# Patient Record
Sex: MALE | Race: WHITE | URBAN - METROPOLITAN AREA
[De-identification: names, ages, dates, MRNs, and addresses within clinical notes are randomized per-mention and may not be internally consistent; named-entity substitution may affect disease eponyms.]

---

## 2019-01-15 ENCOUNTER — OFFICE VISIT (OUTPATIENT)
Dept: URBAN - METROPOLITAN AREA CLINIC 23 | Facility: CLINIC | Age: 74
End: 2019-01-15
Payer: MEDICARE

## 2019-01-15 DIAGNOSIS — H18.053 BILATERAL KRUKENBERG'S SPINDLE: ICD-10-CM

## 2019-01-15 PROCEDURE — 92134 CPTRZ OPH DX IMG PST SGM RTA: CPT | Performed by: OPTOMETRIST

## 2019-01-15 PROCEDURE — 92014 COMPRE OPH EXAM EST PT 1/>: CPT | Performed by: OPTOMETRIST

## 2019-01-15 ASSESSMENT — INTRAOCULAR PRESSURE
OS: 22
OD: 18

## 2019-01-15 NOTE — IMPRESSION/PLAN
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 Plan: Discussed diagnosis in detail with patient. No treatment is required at this time. Reassured patient of current condition and treatment. Will continue to observe condition and or symptoms.

## 2019-01-15 NOTE — IMPRESSION/PLAN
Impression: Type 2 diabetes mellitus w/o complication: O15.8. Plan: Discussed diagnosis in detail with patient. Advised and emphasized patient of blood sugar control. Discussed risks of progression. Poor compliance can lead to blindness. OCT macula performed and reviewed with patient today- no signs of diabetic retinopathy. Reassured patient of condition and treatment. Will continue to observe condition and or symptoms.

## 2020-01-31 ENCOUNTER — OFFICE VISIT (OUTPATIENT)
Dept: URBAN - METROPOLITAN AREA CLINIC 23 | Facility: CLINIC | Age: 75
End: 2020-01-31
Payer: MEDICARE

## 2020-01-31 DIAGNOSIS — E11.9 TYPE 2 DIABETES MELLITUS W/O COMPLICATION: Primary | ICD-10-CM

## 2020-01-31 PROCEDURE — 92014 COMPRE OPH EXAM EST PT 1/>: CPT | Performed by: OPTOMETRIST

## 2020-01-31 ASSESSMENT — INTRAOCULAR PRESSURE
OD: 15
OS: 14

## 2020-01-31 ASSESSMENT — KERATOMETRY
OD: 44.63
OS: 44.88

## 2020-01-31 NOTE — IMPRESSION/PLAN
Impression: Type 2 diabetes mellitus w/o complication: E86.0. Plan: Diabetes type II: no background retinopathy, no signs of neovascularization noted. Discussed ocular and systemic benefits of blood sugar control. Optos done. Stable. Monitor yearly.

## 2021-02-24 ENCOUNTER — OFFICE VISIT (OUTPATIENT)
Dept: URBAN - METROPOLITAN AREA CLINIC 23 | Facility: CLINIC | Age: 76
End: 2021-02-24
Payer: MEDICARE

## 2021-02-24 PROCEDURE — 99213 OFFICE O/P EST LOW 20 MIN: CPT | Performed by: OPTOMETRIST

## 2021-02-24 ASSESSMENT — INTRAOCULAR PRESSURE
OS: 22
OD: 22

## 2021-02-24 NOTE — IMPRESSION/PLAN
Impression: Type 2 diabetes mellitus w/o complication: I91.5. Plan: Diabetes type II: no background retinopathy, no signs of neovascularization noted. Discussed ocular and systemic benefits of blood sugar control.

## 2021-10-20 ENCOUNTER — OFFICE VISIT (OUTPATIENT)
Dept: URBAN - METROPOLITAN AREA CLINIC 23 | Facility: CLINIC | Age: 76
End: 2021-10-20
Payer: MEDICARE

## 2021-10-20 DIAGNOSIS — H25.13 AGE-RELATED NUCLEAR CATARACT, BILATERAL: Primary | ICD-10-CM

## 2021-10-20 DIAGNOSIS — H25.11 AGE-RELATED NUCLEAR CATARACT, RIGHT EYE: ICD-10-CM

## 2021-10-20 PROCEDURE — 99204 OFFICE O/P NEW MOD 45 MIN: CPT | Performed by: OPHTHALMOLOGY

## 2021-10-20 ASSESSMENT — VISUAL ACUITY
OD: 20/25
OS: 20/30

## 2021-10-20 ASSESSMENT — INTRAOCULAR PRESSURE
OS: 21
OD: 22

## 2021-10-20 ASSESSMENT — KERATOMETRY
OD: 44.50
OS: 44.88

## 2021-10-20 NOTE — IMPRESSION/PLAN
Impression: Age-related nuclear cataract, bilateral: H25.13. Condition: established, worsening. Vision: vision affected. Plan: Cataract accounts for patient's complaints. Reviewed risks, benefits, and procedure. Patient desires surgery, schedule ce/iol OS then OD, RL2, discussed lens options, distance refractive target, patient is clear for surgery in [facility]. Recommend Dexycu to avoid noncompliance with drops and to help maintain infection/inflammation.

## 2021-10-20 NOTE — IMPRESSION/PLAN
Impression: Age-related nuclear cataract, right eye: H25.11. Plan: Plan for CEIOL OS then OD as stated above.

## 2021-12-06 ENCOUNTER — TESTING ONLY (OUTPATIENT)
Dept: URBAN - METROPOLITAN AREA CLINIC 23 | Facility: CLINIC | Age: 76
End: 2021-12-06
Payer: MEDICARE

## 2021-12-06 ASSESSMENT — PACHYMETRY
OD: 23.58
OD: 3.12
OS: 23.54
OS: 3.20

## 2021-12-14 ENCOUNTER — SURGERY (OUTPATIENT)
Dept: URBAN - METROPOLITAN AREA SURGERY 11 | Facility: SURGERY | Age: 76
End: 2021-12-14
Payer: MEDICARE

## 2021-12-14 PROCEDURE — 66984 XCAPSL CTRC RMVL W/O ECP: CPT | Performed by: OPHTHALMOLOGY

## 2021-12-15 ENCOUNTER — POST-OPERATIVE VISIT (OUTPATIENT)
Dept: URBAN - METROPOLITAN AREA CLINIC 23 | Facility: CLINIC | Age: 76
End: 2021-12-15

## 2021-12-15 PROCEDURE — 99024 POSTOP FOLLOW-UP VISIT: CPT | Performed by: OPTOMETRIST

## 2021-12-15 RX ORDER — DORZOLAMIDE HCL 20 MG/ML
2 % SOLUTION/ DROPS OPHTHALMIC
Qty: 5 | Refills: 0 | Status: INACTIVE
Start: 2021-12-15 | End: 2022-02-16

## 2021-12-15 ASSESSMENT — INTRAOCULAR PRESSURE
OS: 46
OS: 40
OD: 24
OS: 56
OS: 25

## 2021-12-15 NOTE — IMPRESSION/PLAN
Impression: S/P Cataract Extraction by phacoemulsification with IOL placement; Dexycu OS - 1 Day. Encounter for surgical aftercare following surgery on a sense organ  Z48.810. Post operative instructions reviewed - Plan: Return as scheduled, sooner if vision suddenly changes or pain increases. IOP elevated, paracentesis performed twice. Patient to begin dorzolamide TID OS. Discussed pt. may see floater/Dexycu.

## 2021-12-23 ENCOUNTER — POST-OPERATIVE VISIT (OUTPATIENT)
Dept: URBAN - METROPOLITAN AREA CLINIC 23 | Facility: CLINIC | Age: 76
End: 2021-12-23

## 2021-12-23 DIAGNOSIS — Z48.810 ENCOUNTER FOR SURGICAL AFTERCARE FOLLOWING SURGERY ON A SENSE ORGAN: Primary | ICD-10-CM

## 2021-12-23 PROCEDURE — 99024 POSTOP FOLLOW-UP VISIT: CPT | Performed by: OPTOMETRIST

## 2021-12-23 ASSESSMENT — VISUAL ACUITY: OS: 20/25

## 2021-12-23 ASSESSMENT — INTRAOCULAR PRESSURE
OS: 28
OD: 19

## 2021-12-23 NOTE — IMPRESSION/PLAN
Impression: S/P Cataract Extraction by phacoemulsification with IOL placement; Dexycu OS - 9 Days. Encounter for surgical aftercare following surgery on a sense organ  Z48.810. Post operative instructions reviewed - Condition is improving - Plan: Pt edu. Appropriate appearance. IOP still elevated, continue Dorzolamide TID OS. Proceed with 2nd eye.

## 2022-01-05 ENCOUNTER — SURGERY (OUTPATIENT)
Dept: URBAN - METROPOLITAN AREA SURGERY 11 | Facility: SURGERY | Age: 77
End: 2022-01-05
Payer: MEDICARE

## 2022-01-05 PROCEDURE — 66984 XCAPSL CTRC RMVL W/O ECP: CPT | Performed by: OPHTHALMOLOGY

## 2022-01-06 ENCOUNTER — POST-OPERATIVE VISIT (OUTPATIENT)
Dept: URBAN - METROPOLITAN AREA CLINIC 23 | Facility: CLINIC | Age: 77
End: 2022-01-06

## 2022-01-06 DIAGNOSIS — Z96.1 PRESENCE OF INTRAOCULAR LENS: Primary | ICD-10-CM

## 2022-01-06 PROCEDURE — 99024 POSTOP FOLLOW-UP VISIT: CPT | Performed by: OPTOMETRIST

## 2022-01-06 ASSESSMENT — INTRAOCULAR PRESSURE
OS: 39
OS: 26
OD: 23

## 2022-01-06 NOTE — IMPRESSION/PLAN
Impression: S/P Cataract Extraction by phacoemulsification with IOL placement; DEXYCU OD - 1 Day. Presence of intraocular lens  Z96.1. Post operative instructions reviewed - Plan: Return as scheduled, sooner if vision suddenly changes or pain increases. IOP still elevated in OS, advised patient to continue Dorzolamide TID OS. Advised patient not to take Dorzolamide in OD. Discussed pt. may see floater/Dexycu.

## 2022-01-12 ENCOUNTER — POST-OPERATIVE VISIT (OUTPATIENT)
Dept: URBAN - METROPOLITAN AREA CLINIC 23 | Facility: CLINIC | Age: 77
End: 2022-01-12

## 2022-01-12 PROCEDURE — 99024 POSTOP FOLLOW-UP VISIT: CPT | Performed by: OPTOMETRIST

## 2022-01-12 ASSESSMENT — INTRAOCULAR PRESSURE
OD: 18
OS: 21

## 2022-01-12 NOTE — IMPRESSION/PLAN
Impression: S/P Cataract Extraction by phacoemulsification with IOL placement; DEXYCU OD - 7 Days. Presence of intraocular lens  Z96.1. Post operative instructions reviewed - Condition is improving - Plan: Use medication as directed above and on handout. Return sooner if vision suddenly changes or pain increases.

## 2022-02-16 ENCOUNTER — POST-OPERATIVE VISIT (OUTPATIENT)
Dept: URBAN - METROPOLITAN AREA CLINIC 23 | Facility: CLINIC | Age: 77
End: 2022-02-16

## 2022-02-16 PROCEDURE — 92133 CPTRZD OPH DX IMG PST SGM ON: CPT | Performed by: OPTOMETRIST

## 2022-02-16 PROCEDURE — 99024 POSTOP FOLLOW-UP VISIT: CPT | Performed by: OPTOMETRIST

## 2022-02-16 ASSESSMENT — VISUAL ACUITY
OS: 20/25
OD: 20/25

## 2022-02-16 ASSESSMENT — INTRAOCULAR PRESSURE
OD: 20
OS: 20

## 2022-02-16 NOTE — IMPRESSION/PLAN
Impression: S/P Cataract Extraction by phacoemulsification with IOL placement; DEXYCU OD - 42 Days. Presence of intraocular lens  Z96.1. Post operative instructions reviewed - Condition is improving - Plan: Discussed findings, RNFL OCT ordered and reviewed. No signs of thinning, patient to stop Dorzolamide. Call with any vision changes, monitor. Patient to return for IOP check when he returns to [id number] [facility] next winter.

## 2022-10-20 ENCOUNTER — OFFICE VISIT (OUTPATIENT)
Dept: URBAN - METROPOLITAN AREA CLINIC 23 | Facility: CLINIC | Age: 77
End: 2022-10-20
Payer: MEDICARE

## 2022-10-20 DIAGNOSIS — G51.0 BELL'S PALSY: Primary | ICD-10-CM

## 2022-10-20 DIAGNOSIS — E11.9 TYPE 2 DIABETES MELLITUS WITHOUT COMPLICATIONS: ICD-10-CM

## 2022-10-20 PROCEDURE — 99213 OFFICE O/P EST LOW 20 MIN: CPT | Performed by: OPTOMETRIST

## 2022-10-20 RX ORDER — ERYTHROMYCIN 5 MG/G
OINTMENT OPHTHALMIC
Qty: 3.5 | Refills: 1 | Status: ACTIVE
Start: 2022-10-20

## 2022-10-20 ASSESSMENT — KERATOMETRY
OS: 45.00
OD: 42.00

## 2022-10-20 ASSESSMENT — INTRAOCULAR PRESSURE
OD: 18
OS: 18

## 2022-10-20 NOTE — IMPRESSION/PLAN
Impression: Bell's palsy: G51.0. Right. Condition: improving. Plan: Discussed findings. Patient reports condition is improving from dx in May 2022. Recommend continuing gel or ointment at night, prescribed erythromycin PRN OS. F/u with oculoplastic specialist in 2 months for possible treatment if eye continues to droop. Call if symptoms worsen.

## 2022-10-20 NOTE — IMPRESSION/PLAN
Impression: Type 2 diabetes mellitus without complications: T66.3. Bilateral. Condition: established, stable. Plan: Diabetes type II: no background retinopathy, no signs of neovascularization noted. OPTOS photos ordered and reviewed. Discussed ocular and systemic benefits of blood sugar control.

## 2022-11-21 ENCOUNTER — OFFICE VISIT (OUTPATIENT)
Dept: URBAN - METROPOLITAN AREA CLINIC 23 | Facility: CLINIC | Age: 77
End: 2022-11-21
Payer: MEDICARE

## 2022-11-21 DIAGNOSIS — H02.152 PARALYTIC ECTROPION OF RIGHT LOWER EYELID: Primary | ICD-10-CM

## 2022-11-21 DIAGNOSIS — H57.811 BROW PTOSIS, RIGHT: ICD-10-CM

## 2022-11-21 DIAGNOSIS — G51.0 BELL'S PALSY: ICD-10-CM

## 2022-11-21 DIAGNOSIS — H04.521 EVERSION OF RIGHT LACRIMAL PUNCTUM: ICD-10-CM

## 2022-11-21 PROCEDURE — 92285 EXTERNAL OCULAR PHOTOGRAPHY: CPT | Performed by: OPHTHALMOLOGY

## 2022-11-21 PROCEDURE — 99214 OFFICE O/P EST MOD 30 MIN: CPT | Performed by: OPHTHALMOLOGY

## 2022-11-21 ASSESSMENT — INTRAOCULAR PRESSURE
OD: 18
OS: 18

## 2022-11-21 NOTE — IMPRESSION/PLAN
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 Plan: Patient examined. Chart review. Associated with Bell's Palsy. Patient has signs and symptoms and findings consistent involutional ectropion. This was diagrammatically explained to the patient. Patient voiced understanding. Discussed known options for management (do nothing, conservative management, and surgical intervention.) Risks of surgery discussed: bleeding, infection, dry eye, asymmetry, numbness, and/or need for further surgery. Patient wishes to proceed  with surgery [id number] (TARSAL STRIP) [place] REARRANGEMENT TO CORRECT PUNCTAL ECTROPION

## 2022-12-19 ENCOUNTER — POST-OPERATIVE VISIT (OUTPATIENT)
Dept: URBAN - METROPOLITAN AREA CLINIC 23 | Facility: CLINIC | Age: 77
End: 2022-12-19
Payer: MEDICARE

## 2022-12-19 DIAGNOSIS — Z48.89 ENCOUNTER FOR OTHER SPECIFIED SURGICAL AFTERCARE: Primary | ICD-10-CM

## 2022-12-19 PROCEDURE — 99024 POSTOP FOLLOW-UP VISIT: CPT | Performed by: OPHTHALMOLOGY

## 2022-12-19 ASSESSMENT — INTRAOCULAR PRESSURE
OD: 17
OS: 18

## 2022-12-19 NOTE — IMPRESSION/PLAN
Impression: S/P Right Direct Brow Lift; Right Lower Lid Extensive Ectropion; Right Lower Lid Punctal Inversion OD - 18 Days. Encounter for other specified surgical aftercare  Z48.89. Condition is improving - Plan: Patient examined. Healing well. Suture removed.  

RV 2 months for post op exam.

## 2023-02-21 ENCOUNTER — POST-OPERATIVE VISIT (OUTPATIENT)
Dept: URBAN - METROPOLITAN AREA CLINIC 23 | Facility: CLINIC | Age: 78
End: 2023-02-21
Payer: MEDICARE

## 2023-02-21 DIAGNOSIS — Z48.89 ENCOUNTER FOR OTHER SPECIFIED SURGICAL AFTERCARE: Primary | ICD-10-CM

## 2023-02-21 RX ORDER — ERYTHROMYCIN 5 MG/G
OINTMENT OPHTHALMIC
Qty: 3.5 | Refills: 1 | Status: ACTIVE
Start: 2023-02-21

## 2023-02-21 ASSESSMENT — INTRAOCULAR PRESSURE
OS: 2
OD: 12

## 2023-02-21 NOTE — IMPRESSION/PLAN
Impression: S/P Right Direct Brow Lift; Right Lower Lid Extensive Ectropion; Right Lower Lid Punctal Inversion OD - 82 Days. Encounter for other specified surgical aftercare  Z48.89. Plan: Patient examined, no sign of infection, recurrent brow ptosis noted discussed in office procedure risk/benefits to lift RUL brow, Patient voice concerns and would like to proceed with procedure. RT eye Angler blepharitis noted advised patient to start Erythromycin at bedtime for 10 days, right eye around eyelids. NAUSEA/ABDOMINAL PAIN

## 2023-03-20 ENCOUNTER — PROCEDURE (OUTPATIENT)
Dept: URBAN - METROPOLITAN AREA SURGERY 10 | Facility: SURGERY | Age: 78
End: 2023-03-20
Payer: MEDICARE

## 2023-03-20 DIAGNOSIS — H57.811 BROW PTOSIS, RIGHT: Primary | ICD-10-CM

## 2023-03-20 PROCEDURE — 67900 REPAIR BROW DEFECT: CPT | Performed by: OPHTHALMOLOGY

## 2023-03-20 ASSESSMENT — INTRAOCULAR PRESSURE
OD: 17
OS: 18

## 2023-04-12 ENCOUNTER — POST-OPERATIVE VISIT (OUTPATIENT)
Dept: URBAN - METROPOLITAN AREA CLINIC 28 | Facility: CLINIC | Age: 78
End: 2023-04-12
Payer: MEDICARE

## 2023-04-12 DIAGNOSIS — Z48.89 ENCOUNTER FOR OTHER SPECIFIED SURGICAL AFTERCARE: Primary | ICD-10-CM

## 2023-04-12 PROCEDURE — 99024 POSTOP FOLLOW-UP VISIT: CPT | Performed by: OPHTHALMOLOGY

## 2023-04-12 NOTE — IMPRESSION/PLAN
Impression: S/P Right Direct Brow Lift; Right Lower Lid Extensive Ectropion; Right Lower Lid Punctal Inversion OD - 132 Days. Encounter for other specified surgical aftercare  Z48.89. Plan: Patient examined, no signs of infection suture removed today and healing well. Occlusion cyst at tail end of suture site.  
RV PRN

## 2024-10-29 ENCOUNTER — OFFICE VISIT (OUTPATIENT)
Dept: URBAN - METROPOLITAN AREA CLINIC 23 | Facility: CLINIC | Age: 79
End: 2024-10-29
Payer: MEDICARE

## 2024-10-29 DIAGNOSIS — H16.223 KERATOCONJUNCTIVITIS SICCA, NOT SPECIFIED AS SJ”GREN'S, BILATERAL: ICD-10-CM

## 2024-10-29 DIAGNOSIS — H26.493 OTHER SECONDARY CATARACT, BILATERAL: ICD-10-CM

## 2024-10-29 DIAGNOSIS — H43.813 VITREOUS DEGENERATION, BILATERAL: ICD-10-CM

## 2024-10-29 DIAGNOSIS — E11.9 TYPE 2 DIABETES MELLITUS W/O COMPLICATION: Primary | ICD-10-CM

## 2024-10-29 PROCEDURE — 92134 CPTRZ OPH DX IMG PST SGM RTA: CPT | Performed by: OPTOMETRIST

## 2024-10-29 PROCEDURE — 99213 OFFICE O/P EST LOW 20 MIN: CPT | Performed by: OPTOMETRIST

## 2024-10-29 ASSESSMENT — KERATOMETRY
OS: 44.25
OD: 44.25

## 2024-10-29 ASSESSMENT — INTRAOCULAR PRESSURE
OS: 20
OD: 13